# Patient Record
Sex: MALE | Race: WHITE | NOT HISPANIC OR LATINO | Employment: OTHER | URBAN - METROPOLITAN AREA
[De-identification: names, ages, dates, MRNs, and addresses within clinical notes are randomized per-mention and may not be internally consistent; named-entity substitution may affect disease eponyms.]

---

## 2023-04-24 ENCOUNTER — OFFICE VISIT (OUTPATIENT)
Dept: URGENT CARE | Facility: CLINIC | Age: 48
End: 2023-04-24

## 2023-04-24 VITALS
SYSTOLIC BLOOD PRESSURE: 126 MMHG | TEMPERATURE: 98.5 F | WEIGHT: 260 LBS | BODY MASS INDEX: 39.4 KG/M2 | DIASTOLIC BLOOD PRESSURE: 84 MMHG | RESPIRATION RATE: 18 BRPM | OXYGEN SATURATION: 98 % | HEART RATE: 97 BPM | HEIGHT: 68 IN

## 2023-04-24 DIAGNOSIS — J01.90 ACUTE NON-RECURRENT SINUSITIS, UNSPECIFIED LOCATION: Primary | ICD-10-CM

## 2023-04-24 RX ORDER — AMOXICILLIN AND CLAVULANATE POTASSIUM 875; 125 MG/1; MG/1
1 TABLET, FILM COATED ORAL EVERY 12 HOURS SCHEDULED
Qty: 20 TABLET | Refills: 0 | Status: SHIPPED | OUTPATIENT
Start: 2023-04-24 | End: 2023-05-04

## 2023-04-24 NOTE — PROGRESS NOTES
[facility]        NAME: Merrill Elmore is a 51 y o  male  : 1975    MRN: [id number]  DATE: 2023  TIME: 6:46 PM    Assessment and Plan   Acute non-recurrent sinusitis, unspecified location [J01 90]  1  Acute non-recurrent sinusitis, unspecified location  amoxicillin-clavulanate (AUGMENTIN) 875-125 mg per tablet          Declined COVID and flu testing  Patient Instructions       Patient was educated on sinus infection  Patient was educated on antibiotics prescribed  Patient was educated to eat on antibiotics  Any chest pain or shortness of breath go to ED    Chief Complaint     Chief Complaint   Patient presents with   • Facial Pain     Pt presents with maxillary sinus pain and pressure, pressure behind the eyes, ear fullness and post nasal drip x 3 days ago  Pt states wife was dxd and treated with sinus infection this week  He does not want covid testing done today  History of Present Illness       Patient is here today complaining of head, nose and ear congestion for 3 days  Denies any allergies to medications  Denies any current history of asthma or diabetes  Denies any chances of COVID or flu  Review of Systems   Review of Systems   Constitutional: Negative  HENT: Positive for congestion, sinus pressure and sinus pain  Respiratory: Negative  Cardiovascular: Negative  Psychiatric/Behavioral: Negative  Current Medications       Current Outpatient Medications:   •  amoxicillin-clavulanate (AUGMENTIN) 875-125 mg per tablet, Take 1 tablet by mouth every 12 (twelve) hours for 10 days, Disp: 20 tablet, Rfl: 0    Current Allergies     Allergies as of 2023   • (No Known Allergies)            The following portions of the patient's history were reviewed and updated as appropriate: allergies, current medications, past family history, past medical history, past social history, past surgical history and problem list      History reviewed   No "pertinent past medical history  History reviewed  No pertinent surgical history  History reviewed  No pertinent family history  Medications have been verified  Objective   /84   Pulse 97   Temp 98 5 °F (36 9 °C)   Resp 18   Ht 5' 8\" (1 727 m)   Wt 118 kg (260 lb)   SpO2 98%   BMI 39 53 kg/m²   No LMP for male patient  Physical Exam     Physical Exam  Vitals and nursing note reviewed  Constitutional:       Appearance: Normal appearance  HENT:      Head: Normocephalic  Right Ear: Tympanic membrane, ear canal and external ear normal       Left Ear: Tympanic membrane, ear canal and external ear normal       Ears:      Comments: Pain over maxillary sinus  No pain over frontal sinus     Nose: Nose normal       Mouth/Throat:      Mouth: Mucous membranes are moist       Pharynx: No oropharyngeal exudate or posterior oropharyngeal erythema  Eyes:      Extraocular Movements: Extraocular movements intact  Pupils: Pupils are equal, round, and reactive to light  Cardiovascular:      Rate and Rhythm: Normal rate and regular rhythm  Heart sounds: Normal heart sounds  Pulmonary:      Breath sounds: Normal breath sounds  Neurological:      General: No focal deficit present  Mental Status: He is alert and oriented to person, place, and time     Psychiatric:         Mood and Affect: Mood normal          Behavior: Behavior normal                    "

## 2023-04-24 NOTE — PATIENT INSTRUCTIONS
Patient was educated on sinus infection  Patient was educated on antibiotics prescribed  Patient was educated to eat on antibiotics  Any chest pain or shortness of breath go to ED    Sinusitis   WHAT YOU NEED TO KNOW:   Sinusitis is inflammation or infection of your sinuses  Sinusitis is most often caused by a virus  Acute sinusitis may last up to 12 weeks  Chronic sinusitis lasts longer than 12 weeks  Recurrent sinusitis means you have 4 or more infections in 1 year  DISCHARGE INSTRUCTIONS:   Return to the emergency department if:   You have trouble breathing or wheezing that is getting worse  You have a stiff neck, a fever, or a bad headache  You cannot open your eye  Your eyeball bulges out or you cannot move your eye  You are more sleepy than normal, or you notice changes in your ability to think, move, or talk  You have swelling of your forehead or scalp  Call your doctor if:   You have vision changes, such as double vision  Your eye and eyelid are red, swollen, and painful  Your symptoms do not improve or go away after 10 days  You have nausea and are vomiting  Your nose is bleeding  You have questions or concerns about your condition or care  Medicines: Your symptoms may go away on their own  Your healthcare provider may recommend watchful waiting for up to 10 days before starting antibiotics  You may need any of the following:  Acetaminophen  decreases pain and fever  It is available without a doctor's order  Ask how much to take and how often to take it  Follow directions  Read the labels of all other medicines you are using to see if they also contain acetaminophen, or ask your doctor or pharmacist  Acetaminophen can cause liver damage if not taken correctly  NSAIDs , such as ibuprofen, help decrease swelling, pain, and fever  This medicine is available with or without a doctor's order   NSAIDs can cause stomach bleeding or kidney problems in certain people  If you take blood thinner medicine, always ask your healthcare provider if NSAIDs are safe for you  Always read the medicine label and follow directions  Nasal steroid sprays  may help decrease inflammation in your nose and sinuses  Decongestants  help reduce swelling and drain mucus in the nose and sinuses  They may help you breathe easier  Antihistamines  help dry mucus in the nose and relieve sneezing  Antibiotics  help treat or prevent a bacterial infection  Take your medicine as directed  Contact your healthcare provider if you think your medicine is not helping or if you have side effects  Tell your provider if you are allergic to any medicine  Keep a list of the medicines, vitamins, and herbs you take  Include the amounts, and when and why you take them  Bring the list or the pill bottles to follow-up visits  Carry your medicine list with you in case of an emergency  Self-care:   Rinse your sinuses as directed  Use a sinus rinse device to rinse your nasal passages with a saline (salt water) solution or distilled water  Do not use tap water  This will help thin the mucus in your nose and rinse away pollen and dirt  It will also help reduce swelling so you can breathe normally  Use a humidifier  to increase air moisture in your home  This may make it easier for you to breathe and help decrease your cough  Sleep with your head elevated  Place an extra pillow under your head before you go to sleep to help your sinuses drain  Drink liquids as directed  Ask your healthcare provider how much liquid to drink each day and which liquids are best for you  Liquids will thin the mucus in your nose and help it drain  Avoid drinks that contain alcohol or caffeine  Do not smoke, and avoid secondhand smoke  Nicotine and other chemicals in cigarettes and cigars can make your symptoms worse  Ask your healthcare provider for information if you currently smoke and need help to quit  E-cigarettes or smokeless tobacco still contain nicotine  Talk to your healthcare provider before you use these products  Prevent the spread of germs:   Wash your hands often with soap and water  Wash your hands after you use the bathroom, change a child's diaper, or sneeze  Wash your hands before you prepare or eat food  Stay away from people who are sick  Some germs spread easily and quickly through contact  Follow up with your doctor as directed: You may be referred to an ear, nose, and throat specialist  Write down your questions so you remember to ask them during your visits  © Copyright [employer] 2022 Information is for End User's use only and may not be sold, redistributed or otherwise used for commercial purposes  The above information is an  only  It is not intended as medical advice for individual conditions or treatments  Talk to your doctor, nurse or pharmacist before following any medical regimen to see if it is safe and effective for you

## 2024-09-07 ENCOUNTER — HOSPITAL ENCOUNTER (EMERGENCY)
Facility: HOSPITAL | Age: 49
Discharge: HOME | End: 2024-09-08
Attending: EMERGENCY MEDICINE | Admitting: EMERGENCY MEDICINE
Payer: COMMERCIAL

## 2024-09-07 DIAGNOSIS — S61.419A HAND LACERATION: Primary | ICD-10-CM

## 2024-09-07 PROCEDURE — 90471 IMMUNIZATION ADMIN: CPT

## 2024-09-07 PROCEDURE — 99283 EMERGENCY DEPT VISIT LOW MDM: CPT

## 2024-09-08 ENCOUNTER — APPOINTMENT (EMERGENCY)
Dept: RADIOLOGY | Facility: HOSPITAL | Age: 49
End: 2024-09-08
Payer: COMMERCIAL

## 2024-09-08 VITALS
RESPIRATION RATE: 18 BRPM | TEMPERATURE: 98.7 F | OXYGEN SATURATION: 98 % | DIASTOLIC BLOOD PRESSURE: 97 MMHG | SYSTOLIC BLOOD PRESSURE: 166 MMHG | HEART RATE: 88 BPM

## 2024-09-08 PROCEDURE — 73130 X-RAY EXAM OF HAND: CPT

## 2024-09-08 PROCEDURE — 99284 EMERGENCY DEPT VISIT MOD MDM: CPT | Performed by: EMERGENCY MEDICINE

## 2024-09-08 PROCEDURE — 12002 RPR S/N/AX/GEN/TRNK2.6-7.5CM: CPT | Performed by: EMERGENCY MEDICINE

## 2024-09-08 PROCEDURE — 90715 TDAP VACCINE 7 YRS/> IM: CPT | Performed by: EMERGENCY MEDICINE

## 2024-09-08 RX ORDER — CEPHALEXIN 500 MG/1
500 CAPSULE ORAL EVERY 12 HOURS SCHEDULED
Qty: 14 CAPSULE | Refills: 0 | Status: SHIPPED | OUTPATIENT
Start: 2024-09-08 | End: 2024-09-15

## 2024-09-08 RX ORDER — LIDOCAINE HYDROCHLORIDE 10 MG/ML
10 INJECTION, SOLUTION EPIDURAL; INFILTRATION; INTRACAUDAL; PERINEURAL ONCE
Status: COMPLETED | OUTPATIENT
Start: 2024-09-08 | End: 2024-09-08

## 2024-09-08 RX ORDER — GINSENG 100 MG
1 CAPSULE ORAL ONCE
Status: COMPLETED | OUTPATIENT
Start: 2024-09-08 | End: 2024-09-08

## 2024-09-08 RX ADMIN — TETANUS TOXOID, REDUCED DIPHTHERIA TOXOID AND ACELLULAR PERTUSSIS VACCINE, ADSORBED 0.5 ML: 5; 2.5; 8; 8; 2.5 SUSPENSION INTRAMUSCULAR at 00:07

## 2024-09-08 RX ADMIN — LIDOCAINE HYDROCHLORIDE 10 ML: 10 INJECTION, SOLUTION EPIDURAL; INFILTRATION; INTRACAUDAL; PERINEURAL at 00:07

## 2024-09-08 RX ADMIN — BACITRACIN 1 SMALL APPLICATION: 500 OINTMENT TOPICAL at 00:07

## 2024-09-08 RX ADMIN — CEPHALEXIN 500 MG: 250 CAPSULE ORAL at 00:48

## 2024-09-08 NOTE — ED PROVIDER NOTES
History  Chief Complaint   Patient presents with    Hand Laceration     Pt fell, states he does not know what he tripped over. The room was dark. Fell onto right hand and cut his palm. No headstrike, no thinners, no loc. Unknown tetanus     49 yom with left hand laceration that occurred shortly prior to arrival.  Patient was walking in a dark garage, tripped and his left hand landed on an unknown object on the floor.  Patient denies any other trauma.  Patient washed it out thoroughly with running water.  Uncertain of last tetanus.  Full range of motion.  Neurovascularly intact.        None       History reviewed. No pertinent past medical history.    History reviewed. No pertinent surgical history.    History reviewed. No pertinent family history.  I have reviewed and agree with the history as documented.    E-Cigarette/Vaping     E-Cigarette/Vaping Substances     Social History     Tobacco Use    Smoking status: Never    Smokeless tobacco: Never   Substance Use Topics    Alcohol use: Not Currently    Drug use: Never       Review of Systems   Constitutional:  Negative for fever.   Skin:  Positive for wound.       Physical Exam  Physical Exam  Vitals and nursing note reviewed.   Constitutional:       Appearance: He is well-developed.   HENT:      Head: Normocephalic and atraumatic.      Right Ear: External ear normal.      Left Ear: External ear normal.      Nose: Nose normal.   Eyes:      General: No scleral icterus.  Cardiovascular:      Rate and Rhythm: Normal rate.   Pulmonary:      Effort: Pulmonary effort is normal. No respiratory distress.   Abdominal:      General: There is no distension.   Musculoskeletal:         General: No deformity. Normal range of motion.      Cervical back: Normal range of motion.      Comments: 4 cm complex V-shaped laceration to left palmar aspect proximal to pinky finger.  Full range of motion.  Neurovascularly intact distally.  No evidence of tendon or vascular injury   Skin:      Findings: No rash.   Neurological:      General: No focal deficit present.      Mental Status: He is alert and oriented to person, place, and time.   Psychiatric:         Mood and Affect: Mood normal.         Vital Signs  ED Triage Vitals [09/07/24 2100]   Temperature Pulse Respirations Blood Pressure SpO2   98.7 °F (37.1 °C) 100 18 (!) 172/110 98 %      Temp Source Heart Rate Source Patient Position - Orthostatic VS BP Location FiO2 (%)   Temporal Monitor Sitting Right arm --      Pain Score       --           Vitals:    09/07/24 2100   BP: (!) 172/110   Pulse: 100   Patient Position - Orthostatic VS: Sitting         Visual Acuity      ED Medications  Medications   lidocaine (PF) (XYLOCAINE-MPF) 1 % injection 10 mL (10 mL Infiltration Given by Other 9/8/24 0007)   bacitracin topical ointment 1 small application (1 small application Topical Given 9/8/24 0007)   tetanus-diphtheria-acellular pertussis (BOOSTRIX) IM injection 0.5 mL (0.5 mL Intramuscular Given 9/8/24 0007)   cephalexin (KEFLEX) capsule 500 mg (500 mg Oral Given 9/8/24 0048)       Diagnostic Studies  Results Reviewed       None                   XR hand 3+ views LEFT    (Results Pending)              Procedures  Universal Protocol:  Consent: Verbal consent obtained.  Consent given by: patient  Patient identity confirmed: verbally with patient  Laceration repair    Date/Time: 9/8/2024 12:30 PM    Performed by: Gino Rees DO  Authorized by: Gino Rees DO  Body area: upper extremity  Location details: left hand  Laceration length: 4 cm  Tendon involvement: none  Nerve involvement: none  Vascular damage: no  Anesthesia: local infiltration    Anesthesia:  Local Anesthetic: lidocaine 1% without epinephrine  Anesthetic total: 4 mL    Sedation:  Patient sedated: no      Wound Dehiscence:    Secondary closure or dehiscence: complex    Procedure Details:  Preparation: Patient was prepped and draped in the usual sterile fashion.  Irrigation solution:  saline  Irrigation method: jet lavage  Amount of cleaning: extensive  Debridement: none  Degree of undermining: none  Skin closure: 4-0 nylon  Number of sutures: 9  Technique: simple  Approximation: close  Approximation difficulty: complex  Dressing: 4x4 sterile gauze and antibiotic ointment  Patient tolerance: patient tolerated the procedure well with no immediate complications               ED Course                                               Medical Decision Making  49-year-old male with complex left hand laceration.  Thorough washout, x-ray to assess for foreign body although low suspicion.  Sutures for wound repair.  Follow-up with PCP for suture removal.  Return precautions discussed.  Will administer antibiotics given depth of laceration    Amount and/or Complexity of Data Reviewed  Radiology: ordered.    Risk  OTC drugs.  Prescription drug management.                 Disposition  Final diagnoses:   Hand laceration     Time reflects when diagnosis was documented in both MDM as applicable and the Disposition within this note       Time User Action Codes Description Comment    9/8/2024 12:44 AM Gino Rese [S61.419A] Hand laceration           ED Disposition       ED Disposition   Discharge    Condition   Stable    Date/Time   Sun Sep 8, 2024 12:44 AM    Comment   Kyle Whitley discharge to home/self care.                   Follow-up Information       Follow up With Specialties Details Why Contact Info Additional Information     [facility] Emergency Department Emergency Medicine  For suture removal, 9 total [address] [facility] [id number] [phone] [facility] Emergency Department, [address], [place] [address]            Patient's Medications   Discharge Prescriptions    CEPHALEXIN (KEFLEX) 500 MG CAPSULE    Take 1 capsule (500 mg total) by mouth every 12 (twelve) hours for 7 days       Start Date:  9/8/2024  End Date: 9/15/2024       Order Dose: 500 mg       Quantity: 14 capsule    Refills: 0       No discharge procedures on file.    PDMP Review       None            ED Provider  Electronically Signed by             Gino Rees DO  09/08/24 0054

## 2025-01-20 ENCOUNTER — APPOINTMENT (OUTPATIENT)
Dept: RADIOLOGY | Facility: CLINIC | Age: 50
End: 2025-01-20
Payer: COMMERCIAL

## 2025-01-20 ENCOUNTER — OFFICE VISIT (OUTPATIENT)
Dept: URGENT CARE | Facility: CLINIC | Age: 50
End: 2025-01-20
Payer: COMMERCIAL

## 2025-01-20 VITALS
RESPIRATION RATE: 18 BRPM | TEMPERATURE: 100.9 F | DIASTOLIC BLOOD PRESSURE: 82 MMHG | OXYGEN SATURATION: 94 % | HEART RATE: 95 BPM | SYSTOLIC BLOOD PRESSURE: 152 MMHG

## 2025-01-20 DIAGNOSIS — R05.9 COUGH WITH FEVER: ICD-10-CM

## 2025-01-20 DIAGNOSIS — R50.9 COUGH WITH FEVER: ICD-10-CM

## 2025-01-20 PROCEDURE — G0382 LEV 3 HOSP TYPE B ED VISIT: HCPCS | Performed by: NURSE PRACTITIONER

## 2025-01-20 PROCEDURE — 71046 X-RAY EXAM CHEST 2 VIEWS: CPT

## 2025-01-20 RX ORDER — BROMPHENIRAMINE MALEATE, PSEUDOEPHEDRINE HYDROCHLORIDE, AND DEXTROMETHORPHAN HYDROBROMIDE 2; 30; 10 MG/5ML; MG/5ML; MG/5ML
10 SYRUP ORAL 3 TIMES DAILY PRN
Qty: 150 ML | Refills: 0 | Status: SHIPPED | OUTPATIENT
Start: 2025-01-20

## 2025-01-20 RX ORDER — PREDNISONE 20 MG/1
20 TABLET ORAL 2 TIMES DAILY WITH MEALS
Qty: 10 TABLET | Refills: 0 | Status: SHIPPED | OUTPATIENT
Start: 2025-01-20 | End: 2025-01-25